# Patient Record
Sex: MALE | URBAN - METROPOLITAN AREA
[De-identification: names, ages, dates, MRNs, and addresses within clinical notes are randomized per-mention and may not be internally consistent; named-entity substitution may affect disease eponyms.]

---

## 2019-08-06 ENCOUNTER — TRANSFERRED RECORDS (OUTPATIENT)
Dept: HEALTH INFORMATION MANAGEMENT | Facility: CLINIC | Age: 29
End: 2019-08-06

## 2019-08-07 ENCOUNTER — TRANSFERRED RECORDS (OUTPATIENT)
Dept: HEALTH INFORMATION MANAGEMENT | Facility: CLINIC | Age: 29
End: 2019-08-07

## 2019-08-21 NOTE — TELEPHONE ENCOUNTER
FUTURE VISIT INFORMATION      FUTURE VISIT INFORMATION:    Date: 8.27.19    Time: 3:30    Location:  DermSurg  REFERRAL INFORMATION:    Referring provider:  Laura Shetty    Referring providers clinic:  [facility]    Reason for visit/diagnosis:  BCC, R eyelid, pt will wilbert  registsion to update insurance    RECORDS REQUESTED FROM:       Clinic name Comments Records Status Imaging Status   [facility] 8.7.19 Laura Shetty  In [facility]                 Path # [id number] Stephany De La O. In [facility]                              Gisselle Myers 8.21.19 4:55 pm   Action Taken Faxed request for records from [facility]     Action Hanh Myers 8.21.19 2:00 pm   Action Taken Received records. Sent to [facility] for scanning into chart.

## 2019-08-27 ENCOUNTER — OFFICE VISIT (OUTPATIENT)
Dept: DERMATOLOGY | Facility: CLINIC | Age: 29
End: 2019-08-27
Payer: COMMERCIAL

## 2019-08-27 ENCOUNTER — PRE VISIT (OUTPATIENT)
Dept: DERMATOLOGY | Facility: CLINIC | Age: 29
End: 2019-08-27

## 2019-08-27 DIAGNOSIS — C44.1122 BASAL CELL CARCINOMA (BCC) OF RIGHT LOWER EYELID: Primary | ICD-10-CM

## 2019-08-27 ASSESSMENT — PAIN SCALES - GENERAL: PAINLEVEL: NO PAIN (0)

## 2019-08-27 NOTE — NURSING NOTE
Dermatology Rooming Note    Yuri Henriquez's goals for this visit include:   Chief Complaint   Patient presents with     Derm Problem     Yuri is here today for a BCC right eyelid consult.     Edwardo Amezquita, [facility]

## 2019-08-27 NOTE — PROGRESS NOTES
[facility] Dermatology Note    Dermatology Surgery Clinic  [facility]  Clinics and Surgery Center  [address] [place] [address]    Dermatology Problem List:  1. BCC of the R eyelid; s/p Bx 8/7/19; [facility] TBD  2. Small pedunculated skin colored to hyper pigmented papule (skin tag) of the back    Encounter Date: Aug 27, 2019    CC:  Chief Complaint   Patient presents with     Derm Problem     Yuri is here today for a BCC right eyelid consult.       History of Present Illness:  Mr. Yuri Henriquez is a 29 year old male who presents today for a Mohs consultation regarding a BCC of the lower R eyelid. The patient was referred by Laura Shetty MD of [facility]. Today, the patient is accompanied by his father who leaves in the middle of today's consult. He reports that he has had the lesion on the R lower eyelid for about 6-7 years now. About 2 months ago he returned from [place] and developed a rash of the upper extremities. He saw a doctor for the rash and it was resolved. However, at the time it was noted that he had an atypical lesion under the right eyelid. Two months later, he states that he saw Dr. Shetty at [facility] on 08/06/2019 for the lesion and had a Bx [facility] that indicated BCC, nodular.   Since the Bx, the patient reports that he has had no issues with healing.     The patient shares that his father had a Hx of BCC of the right distal lower extremity about 2 years ago. His mother has breast cancer.      Past Medical History:   There is no problem list on file for this patient.    History reviewed. No pertinent past medical history.  History reviewed. No pertinent surgical history.    Social History:  Social History     Socioeconomic History     Marital status: Single     Spouse name: Not on file     Number of children: Not on file     Years of education: Not on file     Highest education level: Not on file    Occupational History     Not on file   Social Needs     Financial resource strain: Not on file     Food insecurity:     Worry: Not on file     Inability: Not on file     Transportation needs:     Medical: Not on file     Non-medical: Not on file   Tobacco Use     Smoking status: Not on file   Substance and Sexual Activity     Alcohol use: Not on file     Drug use: Not on file     Sexual activity: Not on file   Lifestyle     Physical activity:     Days per week: Not on file     Minutes per session: Not on file     Stress: Not on file   Relationships     Social connections:     Talks on phone: Not on file     Gets together: Not on file     Attends Protestant service: Not on file     Active member of club or organization: Not on file     Attends meetings of clubs or organizations: Not on file     Relationship status: Not on file     Intimate partner violence:     Fear of current or ex partner: Not on file     Emotionally abused: Not on file     Physically abused: Not on file     Forced sexual activity: Not on file   Other Topics Concern     Not on file   Social History Narrative     Not on file       Family History:  Family History   Problem Relation Age of Onset     Melanoma No family hx of      Skin Cancer No family hx of         Medications:  No current outpatient medications on file.       No Known Allergies    Review of Systems:  -Skin Establ Pt: The patient denies any new rash, pruritus, or lesions that are symptomatic, changing or bleeding, except as per HPI.  -Constitutional: The patient is feeling generally well.    Physical exam:  Vitals: There were no vitals taken for this visit.  GEN: This is a well developed, well-nourished male in no acute distress, in a pleasant mood.    SKIN: Focused examination of the R eyelid was performed.  - R lower eyelid, 6 mm thin eroded pink papule  - No other lesions of concern on areas examined.       Impression/Plan:  1. Basal cell carcinoma, nodular, of the R lower  eyelid    Reviewed pathology report and nature of diagnosis.     Risks, benefits, and process of Mohs micrographic surgery were discussed including possibility of damage to surrounding anatomical structures and infection. Healing and pain management was also discussed. Patient is comfortable proceeding with the surgery; consent form was obtained. The patient will be scheduled at his convenience. Recommended that the patient come in with a  for his MMS.    No indication for pre-op antibiotics.    Pre-op written instructions provided.     Follow-up as scheduled for MMS.       Staff Involved:    Scribe Disclosure  I, Rosaura Ruff, am serving as a scribe to document services personally performed by Dr. Lavon Deluna, based on data collection and the provider's statements to me.     Fellow: Mark Gupta MD    Attending Attestation  I attest that the Scribe recorded the interview and exam that I personally performed.  I have reviewed the note and edited it as necessary.    Lavon Deluna M.D.  Professor  Director of Dermatologic Surgery  Department of Dermatology  [facility]

## 2019-09-12 ENCOUNTER — OFFICE VISIT (OUTPATIENT)
Dept: DERMATOLOGY | Facility: CLINIC | Age: 29
End: 2019-09-12
Payer: COMMERCIAL

## 2019-09-12 VITALS — SYSTOLIC BLOOD PRESSURE: 139 MMHG | HEART RATE: 98 BPM | DIASTOLIC BLOOD PRESSURE: 93 MMHG

## 2019-09-12 DIAGNOSIS — C44.1122 BASAL CELL CARCINOMA OF RIGHT LOWER EYELID: Primary | ICD-10-CM

## 2019-09-12 RX ORDER — DIAZEPAM 5 MG
5 TABLET ORAL ONCE
Status: COMPLETED | OUTPATIENT
Start: 2019-09-12 | End: 2019-09-12

## 2019-09-12 RX ADMIN — DIAZEPAM 5 MG: 5 TABLET ORAL at 08:20

## 2019-09-12 ASSESSMENT — PAIN SCALES - GENERAL: PAINLEVEL: NO PAIN (0)

## 2019-09-12 NOTE — PROGRESS NOTES
[facility] Mohs Dermatologic Surgery Procedure Note    Dermatology Surgery Clinic  [facility]  Clinics and Surgery Center  [address] [place] [id number]    Date of Service:  Sep 12, 2019  Surgery: Mohs micrographic surgery    Surgeon: Lavon Deluna MD    Case 1  Repair Type: local flap (advancement)  Repair Size: 1.8 x 2.5 cm  Suture Material: Fast Absorbing Gut 5-0  Tumor Type: BCC - Basal cell carcinoma  Location: right lower eyelid  Derm-Path Accession #: [id number]  PreOp Size: 0.5 x 1.0 cm  PostOp Size: 1.0 x 1.0 cm  Mohs Accession #:  [id number]  Level of Defect: fascia      Procedure:  We discussed the principles of treatment and most likely complications including scarring, bleeding, infection, swelling, pain, crusting, nerve damage, large wound,  incomplete excision, wound dehiscence,  nerve damage, recurrence, and a second procedure may be recommended to obtain the best cosmetic or functional result.    Informed consent was obtained and the patient underwent the procedure as follows:  The patient was placed supine on the operating table.  The cancer was identified, outlined with a marker, and verified by the patient.  The entire surgical field was prepped with Hibiclens.  The surgical site was anesthetized using Lidocaine 1% with epi 1:100,000.    The area of clinically apparent tumor was not debulked. The layer of tissue was then surgically excised using a #15 blade and was then transferred onto a specimen sheet maintaining the orientation of the specimen. Hemostasis was obtained using heat cautery. The wound site was then covered with a dressing while the tissue samples were processed for examination.    The excised tissue was transported to the Mohs histology laboratory maintaining the tissue orientation.  The tissue specimen was relaxed so that the entire surgical margin was in a a single horizontal plane for sectioning and inked for precise mapping.  A  precise reference map was drawn to reflect the sectioning of the specimen, colored inking of the margins, and orientation on the patient. The tissue was processed using horizontal sectioning of the base and continuous peripheral margins.  The histopathologic sections were reviewed in conjunction with the reference map.    Total blocks: 1    Total slides:  2    There were no cancer cells visualized on examination, therefore Mohs surgery was complete.      Reconstruction:  Advancement Flap    PROCEDURE:  The patient was taken to the operative suite and placed supine on the operating room table.  The defect was identified.  Appropriate markings were made with a marking pen to plan the repair.  The area was infiltrated with Lidocaine 1% with epi 1:100,000 and prepped with Hibiclens and draped with sterile towels.     The wound was debeveled and undermined broadly in all directions to the level of fat. Hemostasis was obtained using electrocoagulation.  The advancement flap was incised to the level of fat removing a cone of redundant tissue inferiorly. The flap was further undermined in all directions.    Hemostasis was again obtained.  The flap was then advanced into the defect and secured using 5-0 Monocryl buried vertical mattress sutures.  Redundant areas of tissue were excised using the triangulation technique.  The flap wound edges of both the primary and secondary defects were then approximated with 5-0 Monocryl buried sutures and the epidermis was then carefully approximated using simple running 5-0 fast absorbing gut sutures.  The wound was cleansed with saline, and ointment was applied along the wound surface.  A sterile pressure dressing of non-adherent gauze was applied and wound care instructions were given verbally and in writing.  The patient left the operating suite in stable condition.      Repair Size:   1.8 x 2.5 cm    The attending surgeon was present for the entire procedure and always immediately  available.    Follow up in 1 week.    Staff Involved:    Scribe Disclosure  I, Rosaura Speedy, am serving as a scribe to document services personally performed by Dr. Lavon Deluna, based on data collection and the provider's statements to me.     Attending attestation:  I personally performed the entire procedure.  I have reviewed the note and edited it as necessary, and agree with its contents.    Lavon Deluna M.D.  Professor  Director of Dermatologic Surgery  Department of Dermatology  [facility]    Dermatology Surgery Clinic  [facility] Surgery [place] [id number]

## 2019-09-12 NOTE — PATIENT INSTRUCTIONS
Wound Care Instructions  I will experience scar, altered skin color, bleeding, swelling, pain, crusting and redness. I may experience altered sensation. Risks are excessive bleeding, infection, muscle weakness, thick (hypertrophic or keloidal) scar, and recurrence,. A second procedure may be recommended to obtain the best cosmetic or functional result.  Possible complications of any surgical procedure are bleeding, infection, scarring, alteration in skin color and sensation, muscle weakness in the area, wound dehiscence or seperation, or recurrence of the lesion or disease. On occasion, after healing, a secondary procedure or revision may be recommended in order to obtain the best cosmetic or functional result.   After your surgery, a pressure bandage will be placed over the area that has sutures. This will help prevent bleeding.   For the First 48 hours After Surgery:  1. Leave the pressure bandage on and keep it dry. If it should come loose, you may retape it, but do not take it off.  2. Relax and take it easy. Do not do any vigorous exercise, heavy lifting, or bending forward. This could cause the wound to bleed.  3. Post-operative pain is usually mild. You may take plain or extra strength Tylenol every 4 hours as needed (do not take more than 4,000mg in one day). Do not take any medicine that contains aspirin, ibuprofen or motrin unless you have been recommended these by a doctor.  Avoid alcohol and vitamin E as these may increase your tendency to bleed.  4. You may put an ice pack around the bandaged area for 20 minutes every 2-3 hours. This may help reduce swelling, bruising, and pain. Make sure the ice pack is waterproof so that the pressure bandage does not get wet.   5. You may see a small amount of drainage or blood on your pressure bandage. This is normal. However, if drainage or bleeding continues or saturates the bandage, you will need to apply firm pressure over the bandage with a washcloth for 15  minutes. If bleeding continues after applying pressure for 15 minutes then go to the nearest emergency room.  48 Hours After Surgery  Carefully remove the bandage and start daily wound care and dressing changes. You may also now shower and get the wound wet. Wash wound with a mild soap and water.  Use caution when washing the wound. Be gentle and do not let the forceful shower stream hit the wound directly.  PAT dry.  Daily Wound Care:  1. Wash wound with a mild soap and water.  Use caution when washing the wound, be gentle and do not let the forceful shower stream hit the wound directly.  2. PAT DRY.  3. Apply Vaseline (from a new container or tube) over the suture line with a Q-tip. It is very important to keep the wound continuously moist, as wounds heal best in a moist environment.  4.  Keep the site covered until sutures are removed, you can cover it with a Telfa (non-stick) dressing and tape or a band-aid.    5. If you are unable to keep wound covered, you must apply Vaseline every 2 - 3 hours (while awake) to ensure it is being kept moist for optimal healing. A dressing overnight is recommended to keep the area moist.   Call Us If:  1. You have pain that is not controlled with Tylenol.  2. You have signs or symptoms of an infection, such as: fever over 100 degrees F, redness, warmth, or foul-smelling or yellow/creamy drainage from the wound.  Who should I call with questions?    [facility]: [phone]     [facility]: [phone]    For urgent needs outside of business hours call the [facility] at [phone] and ask for the dermatology resident on call

## 2019-09-12 NOTE — NURSING NOTE
Drug Administration Record      Drug Name: Valium   Dose: 5 mg  Route administered: PO  NDC #: 003 [id number]      LOT #: [id number]  : Karen  EXPIRATION DATE: 11/2020    Carisa Amezquita RN

## 2019-09-12 NOTE — NURSING NOTE
Chief Complaint   Patient presents with     Derm Problem     R eyelid BCC     Sunitha Russell, EMT

## 2019-09-12 NOTE — LETTER
9/12/2019       RE: Yuri Henriquez  [address]  [place] [address]     Dear Colleague,    Thank you for referring your patient, Yuri Henriquez, to the [facility] DERMATOLOGIC SURGERY at [facility]. Please see a copy of my visit note below.    [facility] Mohs Dermatologic Surgery Procedure Note    Dermatology Surgery Clinic  [facility]  Clinics and Surgery Center  [address] [place] [address]    Date of Service:  Sep 12, 2019  Surgery: Mohs micrographic surgery    Surgeon: Lavon Deluna MD    Case 1  Repair Type: local flap (advancement)  Repair Size: 1.8 x 2.5 cm  Suture Material: Fast Absorbing Gut 5-0  Tumor Type: BCC - Basal cell carcinoma  Location: right lower eyelid  Derm-Path Accession #: [id number]  PreOp Size: 0.5 x 1.0 cm  PostOp Size: 1.0 x 1.0 cm  Mohs Accession #:  [id number]  Level of Defect: fascia      Procedure:  We discussed the principles of treatment and most likely complications including scarring, bleeding, infection, swelling, pain, crusting, nerve damage, large wound,  incomplete excision, wound dehiscence,  nerve damage, recurrence, and a second procedure may be recommended to obtain the best cosmetic or functional result.    Informed consent was obtained and the patient underwent the procedure as follows:  The patient was placed supine on the operating table.  The cancer was identified, outlined with a marker, and verified by the patient.  The entire surgical field was prepped with Hibiclens.  The surgical site was anesthetized using Lidocaine 1% with epi 1:100,000.    The area of clinically apparent tumor was not debulked. The layer of tissue was then surgically excised using a #15 blade and was then transferred onto a specimen sheet maintaining the orientation of the specimen. Hemostasis was obtained using heat cautery. The wound site was then covered with a dressing while the tissue samples  were processed for examination.    The excised tissue was transported to the Mohs histology laboratory maintaining the tissue orientation.  The tissue specimen was relaxed so that the entire surgical margin was in a a single horizontal plane for sectioning and inked for precise mapping.  A precise reference map was drawn to reflect the sectioning of the specimen, colored inking of the margins, and orientation on the patient. The tissue was processed using horizontal sectioning of the base and continuous peripheral margins.  The histopathologic sections were reviewed in conjunction with the reference map.    Total blocks: 1    Total slides:  2    There were no cancer cells visualized on examination, therefore Mohs surgery was complete.      Reconstruction:  Advancement Flap    PROCEDURE:  The patient was taken to the operative suite and placed supine on the operating room table.  The defect was identified.  Appropriate markings were made with a marking pen to plan the repair.  The area was infiltrated with Lidocaine 1% with epi 1:100,000 and prepped with Hibiclens and draped with sterile towels.     The wound was debeveled and undermined broadly in all directions to the level of fat. Hemostasis was obtained using electrocoagulation.  The advancement flap was incised to the level of fat removing a cone of redundant tissue inferiorly. The flap was further undermined in all directions.    Hemostasis was again obtained.  The flap was then advanced into the defect and secured using 5-0 Monocryl buried vertical mattress sutures.  Redundant areas of tissue were excised using the triangulation technique.  The flap wound edges of both the primary and secondary defects were then approximated with 5-0 Monocryl buried sutures and the epidermis was then carefully approximated using simple running 5-0 fast absorbing gut sutures.  The wound was cleansed with saline, and ointment was applied along the wound surface.  A sterile  pressure dressing of non-adherent gauze was applied and wound care instructions were given verbally and in writing.  The patient left the operating suite in stable condition.      Repair Size:   1.8 x 2.5 cm    The attending surgeon was present for the entire procedure and always immediately available.    Follow up in 1 week.    Staff Involved:    Scribe Disclosure  I, Rosaura Speedy, am serving as a scribe to document services personally performed by Dr. Lavon Deluna, based on data collection and the provider's statements to me.     Attending attestation:  I personally performed the entire procedure.  I have reviewed the note and edited it as necessary, and agree with its contents.    Lavon Deluna M.D.  Professor  Director of Dermatologic Surgery  Department of Dermatology  [facility]    Dermatology Surgery Clinic  [facility] [address]            Again, thank you for allowing me to participate in the care of your patient.      Sincerely,    Lavon Deluna MD

## 2019-09-12 NOTE — LETTER
Date:September 18, 2019      Patient was self referred, no letter generated. Do not send.        [facility] Information

## 2019-09-17 ENCOUNTER — OFFICE VISIT (OUTPATIENT)
Dept: DERMATOLOGY | Facility: CLINIC | Age: 29
End: 2019-09-17
Payer: COMMERCIAL

## 2019-09-17 DIAGNOSIS — C44.1122 BASAL CELL CARCINOMA (BCC) OF RIGHT LOWER EYELID: Primary | ICD-10-CM

## 2019-09-17 NOTE — PROGRESS NOTES
[facility] Dermatology Note    Dermatology Surgery Clinic  [facility]  Clinics and Surgery Center  [address] [place] [id number]    Dermatology Problem List:  1. BCC of the R eyelid; s/p Bx 8/7/19; MMS 9/12/19  2. Small pedunculated skin colored to hyper pigmented papule (skin tag) of the back    Encounter Date: Sep 17, 2019    CC:  Chief Complaint   Patient presents with     Derm Problem     wound check, no complaints, no pain       History of Present Illness:  Mr. Yuri Henriquez is a 29 year old male who presents today for a followup from the previous surgery. Details are below.    Original Procedure Date: September 12, 2019  Reason for visit: [facility] for BCC   Bleeding that required medical attention: None  Infection: None  Hospitalization for procedure within 30 days: None  Are you having any functional problems since the surgery: N/A     Case(s) Specific Information:  Mohs Case 1:  Procedure Location: R lower eyelid  Type of Repair: Local flap (advancement)  Is patient happy with appearance of scar: NA  On 1-10 scale, with 10 being how the area looked before the surgery and 1 being the worst imaginable scar, how do you think it looks today?: NA    The pt comes in today for followup from her [facility] with local flap (advancement) repair of a BCC of the R lower eyelid on 9/12/19.   He is accompanied by his wife and baby son. Today the pt reports that he is doing well overall. Denies any issues with pain or bleeding of the surgical site. He notes that the bleeding stopped after the first day, and he took Tylenol for the first 2 days post-op. 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     The patient is otherwise feeling well. There are no other skin concerns at this time.      Past Medical History:   There is no problem list on file for this patient.    History reviewed. No pertinent past medical history.  History reviewed. No pertinent  surgical history.    Social History:  Social History     Socioeconomic History     Marital status: Single     Spouse name: Not on file     Number of children: Not on file     Years of education: Not on file     Highest education level: Not on file   Occupational History     Not on file   Social Needs     Financial resource strain: Not on file     Food insecurity:     Worry: Not on file     Inability: Not on file     Transportation needs:     Medical: Not on file     Non-medical: Not on file   Tobacco Use     Smoking status: Never Smoker     Smokeless tobacco: Never Used   Substance and Sexual Activity     Alcohol use: Not on file     Drug use: Not on file     Sexual activity: Not on file   Lifestyle     Physical activity:     Days per week: Not on file     Minutes per session: Not on file     Stress: Not on file   Relationships     Social connections:     Talks on phone: Not on file     Gets together: Not on file     Attends Quaker service: Not on file     Active member of club or organization: Not on file     Attends meetings of clubs or organizations: Not on file     Relationship status: Not on file     Intimate partner violence:     Fear of current or ex partner: Not on file     Emotionally abused: Not on file     Physically abused: Not on file     Forced sexual activity: Not on file   Other Topics Concern     Parent/sibling w/ CABG, MI or angioplasty before 65F 55M? Not Asked   Social History Narrative     Not on file       Family History:  Family History   Problem Relation Age of Onset     Melanoma No family hx of      Skin Cancer No family hx of         Medications:  No current outpatient medications on file.       No Known Allergies    Review of Systems:  As per HPI.  -Skin Establ Pt: The patient denies any new rash, pruritus, or lesions that are symptomatic, changing or bleeding, except as per HPI.  -Constitutional: The patient is feeling generally well.    Physical exam:  Vitals: There were no vitals  taken for this visit.  GEN: This is a well developed, well-nourished male in no acute distress, in a pleasant mood.    SKIN: Focused examination of the R eye region was performed.  - Well healing advancement flap. Completely viable. Minimal bruising and no evidence of infection.  - No other lesions of concern on areas examined.       Impression/Plan:  1. BCC of R lower eyelid, s/p MMS 9/12/19   Well healing advancement flap. Completely viable. Minimal bruising and no evidence of infection. Pt was instructed to keep the wound covered for a few more days.     Instructed to continue daily dressing changes and application of petroleum jelly until healed over with new pink skin and all sutures are dissolved.     Recommended sunscreens SPF #50 or greater and protective clothing. Risk of scar dyspigmentation discussed.     Continue periodic self skin exams and report of any new or changing lesions.     Please refer to previous clinic note for details     Follow-up in 3 months.       Staff Involved:    Scribe Disclosure  I, Rosaura Speedy, am serving as a scribe to document services personally performed by Dr. Lavon Deluna, based on data collection and the provider's statements to me.     Fellow: Mark Gupta MD  Resident: Cony Denton MD  Medical Student: Faustina Castillo MD    Attending Attestation  I attest that the Scribe recorded the interview and exam that I personally performed.  I have reviewed the note and edited it as necessary.    Lavon Deluna M.D.  Professor  Director of Dermatologic Surgery  Department of Dermatology  [facility]

## 2019-09-17 NOTE — NURSING NOTE
Chief Complaint   Patient presents with     Derm Problem     wound check, no complaints, no pain     Lara Nava

## 2019-09-17 NOTE — LETTER
Date:September 20, 2019      Patient was self referred, no letter generated. Do not send.        [facility] Information

## 2019-09-17 NOTE — LETTER
9/17/2019       RE: Yuri Henriquez  [address]  [place] [address]     Dear Colleague,    Thank you for referring your patient, Yuri Henriquez, to the [facility] DERMATOLOGIC SURGERY at [facility]. Please see a copy of my visit note below.    [facility] Dermatology Note    Dermatology Surgery Clinic  [facility]  Clinics and Surgery Center  [address] [place] [id number]    Dermatology Problem List:  1. BCC of the R eyelid; s/p Bx 8/7/19; MMS 9/12/19  2. Small pedunculated skin colored to hyper pigmented papule (skin tag) of the back    Encounter Date: Sep 17, 2019    CC:  Chief Complaint   Patient presents with     Derm Problem     wound check, no complaints, no pain       History of Present Illness:  Mr. Yuri Henriquez is a 29 year old male who presents today for a followup from the previous surgery. Details are below.    Original Procedure Date:  September 12, 2019  Reason for visit:  MMS for BCC   Bleeding that required medical attention:  None  Infection:  None  Hospitalization for procedure within 30 days:  None  Are you having any functional problems since the surgery:  N/A     Case(s) Specific Information:  Mohs Case 1:  Procedure Location:  R lower eyelid  Type of Repair:  Local flap (advancement)  Is patient happy with appearance of scar: NA  On 1-10 scale, with 10 being how the area looked before the surgery and 1 being the worst imaginable scar, how do you think it looks today?: NA    The pt comes in today for followup from her MMS with local flap (advancement) repair of a BCC of the R lower eyelid on 9/12/19.   He is accompanied by his wife and baby son. Today the pt reports that he is doing well overall. Denies any issues with pain or bleeding of the surgical site. He notes that the bleeding stopped after the first day, and he took Tylenol for the first 2 days post-op. He shares some concerns with  being out in the sun while attending [employer].     The patient is otherwise feeling well. There are no other skin concerns at this time.      Past Medical History:   There is no problem list on file for this patient.    History reviewed. No pertinent past medical history.  History reviewed. No pertinent surgical history.    Social History:  Social History     Socioeconomic History     Marital status: Single     Spouse name: Not on file     Number of children: Not on file     Years of education: Not on file     Highest education level: Not on file   Occupational History     Not on file   Social Needs     Financial resource strain: Not on file     Food insecurity:     Worry: Not on file     Inability: Not on file     Transportation needs:     Medical: Not on file     Non-medical: Not on file   Tobacco Use     Smoking status: Never Smoker     Smokeless tobacco: Never Used   Substance and Sexual Activity     Alcohol use: Not on file     Drug use: Not on file     Sexual activity: Not on file   Lifestyle     Physical activity:     Days per week: Not on file     Minutes per session: Not on file     Stress: Not on file   Relationships     Social connections:     Talks on phone: Not on file     Gets together: Not on file     Attends Faith service: Not on file     Active member of club or organization: Not on file     Attends meetings of clubs or organizations: Not on file     Relationship status: Not on file     Intimate partner violence:     Fear of current or ex partner: Not on file     Emotionally abused: Not on file     Physically abused: Not on file     Forced sexual activity: Not on file   Other Topics Concern     Parent/sibling w/ CABG, MI or angioplasty before 65F 55M? Not Asked   Social History Narrative     Not on file       Family History:  Family History   Problem Relation Age of Onset     Melanoma No family hx of      Skin Cancer No family hx of         Medications:  No current outpatient medications on  file.       No Known Allergies    Review of Systems:  As per HPI.  -Skin Establ Pt: The patient denies any new rash, pruritus, or lesions that are symptomatic, changing or bleeding, except as per HPI.  -Constitutional: The patient is feeling generally well.    Physical exam:  Vitals: There were no vitals taken for this visit.  GEN: This is a well developed, well-nourished male in no acute distress, in a pleasant mood.    SKIN: Focused examination of the R eye region was performed.  - Well healing advancement flap. Completely viable. Minimal bruising and no evidence of infection.  - No other lesions of concern on areas examined.       Impression/Plan:  1. BCC of R lower eyelid, s/p MMS 9/12/19   Well healing advancement flap. Completely viable. Minimal bruising and no evidence of infection. Pt was instructed to keep the wound covered for a few more days.     Instructed to continue daily dressing changes and application of petroleum jelly until healed over with new pink skin and all sutures are dissolved.     Recommended sunscreens SPF #50 or greater and protective clothing. Risk of scar dyspigmentation discussed.     Continue periodic self skin exams and report of any new or changing lesions.     Please refer to previous clinic note for details     Follow-up in 3 months.       Staff Involved:    Scribe Disclosure  I, Rosaura Speedy, am serving as a scribe to document services personally performed by Dr. Lavon Deluna, based on data collection and the provider's statements to me.     Fellow: Mark Gupta MD  Resident: Cony Denton MD  Medical Student: Faustina Castillo MD    Attending Attestation  I attest that the Scribe recorded the interview and exam that I personally performed.  I have reviewed the note and edited it as necessary.    Lavon Deluna M.D.  Professor  Director of Dermatologic Surgery  Department of Dermatology  [facility]        Again, thank you for allowing me to participate in the care  of your patient.      Sincerely,    Lavon Deluna MD

## 2024-02-06 ENCOUNTER — OFFICE VISIT (OUTPATIENT)
Dept: DERMATOLOGY | Facility: CLINIC | Age: 34
End: 2024-02-06
Payer: COMMERCIAL

## 2024-02-06 DIAGNOSIS — D22.9 MULTIPLE BENIGN NEVI: Primary | ICD-10-CM

## 2024-02-06 DIAGNOSIS — L81.4 LENTIGINES: ICD-10-CM

## 2024-02-06 DIAGNOSIS — D22.9 NEVUS: ICD-10-CM

## 2024-02-06 DIAGNOSIS — L91.8 ACROCHORDON: ICD-10-CM

## 2024-02-06 PROCEDURE — 11302 SHAVE SKIN LESION 1.1-2.0 CM: CPT | Performed by: STUDENT IN AN ORGANIZED HEALTH CARE EDUCATION/TRAINING PROGRAM

## 2024-02-06 PROCEDURE — 99203 OFFICE O/P NEW LOW 30 MIN: CPT | Mod: 25 | Performed by: STUDENT IN AN ORGANIZED HEALTH CARE EDUCATION/TRAINING PROGRAM

## 2024-02-06 ASSESSMENT — PAIN SCALES - GENERAL: PAINLEVEL: NO PAIN (0)

## 2024-02-06 NOTE — NURSING NOTE
Dermatology Rooming Note    Yuri Henriquez's goals for this visit include:   Chief Complaint   Patient presents with    Derm Problem     Lesions of concern on left upper back and left cheek      Charlotte Goel LPN

## 2024-02-06 NOTE — PROGRESS NOTES
[facility] Dermatology Note    Encounter Date: Feb 6, 2024    Dermatology Problem List:  #Hx NMSC   - BCC R lower eyelid     Major PMHx  -   ______________________________________    Impression/Plan:  Yuri was seen today for derm problem.    Diagnoses and all orders for this visit:    Multiple benign nevi  Lentigines  - Reviewed the compounding benefits of incremental changes to sun protective clothing behaviors including increased frequency of sunscreen and sun protective clothing like broad brimmed hats and longsleeved UPF containing clothing    Nevus  -     SHAV SKIN LESION TRUNK/ARM/LEG 1.1-2.0 CM  -Painful, getting caught on clothing, occasionally bleeding  - Due to intolerable side effects, shave removal performed for palliation of symptoms  - See procedure note  - 1.2 cm    Acrochordon  - scrotal and perineal area (sites of friction)  - benign      - Shave Removal PROCEDURE NOTE: After written informed consent was obtained, a time out was taken to identify the patient and the correct site for removal. The lesion on the back 1.2cm was cleansed with a 70% isopropyl alcohol wipe, and then injected with 1cc of lidocaine 1% with epinephrine 1:100,000. Once anesthesia was ensured, the visible surface of the lesion was removed using traction and hyfrecator on a setting of 10 in standard technique applied to the base of lesion. Hemostasis was obtained with pressure and aluminum chloride 20% solution. The wound was dressed with white petrolatum and an adhesive bandage. The patient tolerated the procedure well. Post-procedure instructions and recommendations were provided both verbally and in writing.     Follow-up PRN.       Staff Involved:  Staff Only    Rush Deluna MD   of Dermatology  Department of Dermatology  [facility]      CC:   Chief Complaint   Patient presents with    Derm Problem     Lesions of concern on left upper back and left  cheek, and testicular area       History of Present Illness:  Mr. Yuri Henriquez is a 33 year old male who presents as a new patient.    Pt presents today for concerns about spots on trunk and extremities     Most concerning spots are on upper back, L cheek, and testicular area     Labs:      Physical exam:  Vitals: There were no vitals taken for this visit.  GEN: well developed, well-nourished, in no acute distress, in a pleasant mood.     SKIN: Moreira phototype 2  - Waist-up skin, which includes the head/face, neck, both arms, chest, back, abdomen, digits and/or nails was examined.  - Flat brown macules and patches in a sun exposed areas on face and extremities  - scattered brown papules on trunk and extremities   - pedunculated papules on perineal area   - No other lesions of concern on areas examined.     Past Medical History:   No past medical history on file.  No past surgical history on file.    Social History:   reports that he has never smoked. He has never used smokeless tobacco.    Family History:  Family History   Problem Relation Age of Onset    Melanoma No family hx of     Skin Cancer No family hx of        Medications:  No current outpatient medications on file.     No Known Allergies

## 2024-02-06 NOTE — LETTER
2/6/2024       RE: Yuri Henriquez  [address]  [place] [address]     Dear Colleague,    Thank you for referring your patient, Yuri Henriquez, to the [facility] DERMATOLOGY CLINIC [place] at [facility]. Please see a copy of my visit note below.    [facility] Dermatology Note    Encounter Date: Feb 6, 2024    Dermatology Problem List:  #Hx NMSC   - BCC R lower eyelid     Major PMHx  -   ______________________________________    Impression/Plan:  Yuri was seen today for derm problem.    Diagnoses and all orders for this visit:    Multiple benign nevi  Lentigines  - Reviewed the compounding benefits of incremental changes to sun protective clothing behaviors including increased frequency of sunscreen and sun protective clothing like broad brimmed hats and longsleeved UPF containing clothing    Nevus  -     SHAV SKIN LESION TRUNK/ARM/LEG 1.1-2.0 CM  -Painful, getting caught on clothing, occasionally bleeding  - Due to intolerable side effects, shave removal performed for palliation of symptoms  - See procedure note  - 1.2 cm    Acrochordon  - scrotal and perineal area (sites of friction)  - benign      - Shave Removal PROCEDURE NOTE: After written informed consent was obtained, a time out was taken to identify the patient and the correct site for removal. The lesion on the back 1.2cm was cleansed with a 70% isopropyl alcohol wipe, and then injected with 1cc of lidocaine 1% with epinephrine 1:100,000. Once anesthesia was ensured, the visible surface of the lesion was removed using traction and hyfrecator on a setting of 10 in standard technique applied to the base of lesion. Hemostasis was obtained with pressure and aluminum chloride 20% solution. The wound was dressed with white petrolatum and an adhesive bandage. The patient tolerated the procedure well. Post-procedure instructions and recommendations were provided both  verbally and in writing.     Follow-up PRN.       Staff Involved:  Staff Only    Rush Deluna MD   of Dermatology  Department of Dermatology  [facility]      CC:   Chief Complaint   Patient presents with    Derm Problem     Lesions of concern on left upper back and left cheek, and testicular area       History of Present Illness:  Mr. Yuri Henriquez is a 33 year old male who presents as a new patient.    Pt presents today for concerns about spots on trunk and extremities     Most concerning spots are on upper back, L cheek, and testicular area     Labs:      Physical exam:  Vitals: There were no vitals taken for this visit.  GEN: well developed, well-nourished, in no acute distress, in a pleasant mood.     SKIN: Moreira phototype 2  - Waist-up skin, which includes the head/face, neck, both arms, chest, back, abdomen, digits and/or nails was examined.  - Flat brown macules and patches in a sun exposed areas on face and extremities  - scattered brown papules on trunk and extremities   - pedunculated papules on perineal area   - No other lesions of concern on areas examined.     Past Medical History:   No past medical history on file.  No past surgical history on file.    Social History:   reports that he has never smoked. He has never used smokeless tobacco.    Family History:  Family History   Problem Relation Age of Onset    Melanoma No family hx of     Skin Cancer No family hx of        Medications:  No current outpatient medications on file.     No Known Allergies

## (undated) RX ORDER — DIAZEPAM 5 MG
TABLET ORAL
Status: DISPENSED
Start: 2019-09-12